# Patient Record
Sex: MALE | Race: WHITE | Employment: OTHER | URBAN - METROPOLITAN AREA
[De-identification: names, ages, dates, MRNs, and addresses within clinical notes are randomized per-mention and may not be internally consistent; named-entity substitution may affect disease eponyms.]

---

## 2023-12-08 ENCOUNTER — OFFICE VISIT (OUTPATIENT)
Dept: CARDIOLOGY | Facility: HOSPITAL | Age: 78
End: 2023-12-08
Payer: MEDICARE

## 2023-12-08 VITALS
HEIGHT: 73 IN | BODY MASS INDEX: 26.11 KG/M2 | SYSTOLIC BLOOD PRESSURE: 149 MMHG | DIASTOLIC BLOOD PRESSURE: 81 MMHG | WEIGHT: 197 LBS | OXYGEN SATURATION: 98 % | HEART RATE: 83 BPM

## 2023-12-08 DIAGNOSIS — I50.20 ACC/AHA STAGE C SYSTOLIC HEART FAILURE (MULTI): Primary | ICD-10-CM

## 2023-12-08 DIAGNOSIS — I48.0 PAROXYSMAL ATRIAL FIBRILLATION (MULTI): ICD-10-CM

## 2023-12-08 DIAGNOSIS — I42.8 NONISCHEMIC CARDIOMYOPATHY (MULTI): ICD-10-CM

## 2023-12-08 PROCEDURE — 1126F AMNT PAIN NOTED NONE PRSNT: CPT | Performed by: INTERNAL MEDICINE

## 2023-12-08 PROCEDURE — 99205 OFFICE O/P NEW HI 60 MIN: CPT | Performed by: INTERNAL MEDICINE

## 2023-12-08 PROCEDURE — 99215 OFFICE O/P EST HI 40 MIN: CPT | Performed by: INTERNAL MEDICINE

## 2023-12-08 ASSESSMENT — PAIN SCALES - GENERAL: PAINLEVEL: 0-NO PAIN

## 2023-12-08 NOTE — PATIENT INSTRUCTIONS
It was a pleasure seeing you today. Please contact myself or my team with any questions.     To reach Dr. Parkinson' office please call [phone] (Brenda).   Fax: [phone]   To schedule an appointment call [phone]     If you have any questions or need cardiac medication refills, please call the Heart Failure office at [phone], option 6. You may also contact the  Heart Failure Nursing team via email at [email or web address] (Please include your name and date of birth).         1) Stop valsartan   2) Start entresto 24/26 mg twice a day  3) Start jardiance 10 mg once a day  4) Labs in 7-10 days (RFP/BNP)  5) Call or email with your blood pressure and heart rate in 2 weeks  6) Follow up 3 months at /[facility]      Ph[facility]  [facility]

## 2023-12-08 NOTE — PROGRESS NOTES
"[facility] Advanced Heart Failure Clinic  Primary Care Physician: Rick Sawyer  Referring Provider/Cardiologist: Lore ([facility])     Date of Visit: 12/08/2023  2:00 PM EST  Location of visit: [facility]     HPI:   Mr. 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     Here with his wife today. He reports ongoing exertional dyspnea. He has undergone an evaluation including a repeat cardiac catheterization to look for ischemic heart disease which was not found. He is also being evaluated for possible upgrade to CRT therapy.     He current denies chest pain, pressure, or palpitations. He reports ongoing dyspnea and LE edema. He occasionally monitors his vitals at home.       PMHx:  CAD s/p PCI (RCA; 2006), stage C systolic HF/NICM/HFrEF with severe LV dysfunction (LVEF 20-25%) currently without an ICD, AF on DOAC, SSS s/p ppm, HTN, DM    SocHx:  , lives with his wife in [facility]  Denies tobacco/etoh/illicits    FamHx:  Father with CAD        Visit Vitals  /81 (BP Location: Left arm, Patient Position: Sitting)   Pulse 83   Ht 1.854 m (6' 1\")   Wt 89.4 kg (197 lb)   SpO2 98%   BMI 25.99 kg/m²   Smoking Status Never Assessed   BSA 2.15 m²        Physical Exam:  On exam Mr. Nolasco appears his stated age, is alert and oriented x3, and in no acute distress. His sclera are anicteric and his oropharynx has moist mucous membranes. His neck is supple and without thyromegaly. The JVP is ~10 cm of water above the right atrium. His cardiac exam has regular rhythm, normal S1, S2. No S3/4. There is a II/VI HSM. His lungs are clear to auscultation bilaterally and there is no dullness to percussion. His abdomen is soft, nontender with normoactive bowel sounds. There is no HJR. " The extremities are warm and with trace-1+ pitting edema. The skin is dry. There is no rash present. The distal pulses are 2+ in all four extremities. His mood and affect are appropriate for todays encounter.       Cardiac Labs/Diagnostics:  Echo (10/20/23; [facility])  LVEF 25-30%  Mod MR  Mildly enlarged RV      Impression/Plan:  Mr. Nolasco is a 78M with a PMHx sig for CAD s/p PCI (RCA; 2006), stage C systolic HF/NICM/HFrEF with severe LV dysfunction (LVEF 25-30%) currently without an ICD, AF on DOAC, SSS s/p ppm, HTN, and DM who was referred to the Advanced Heart Failure clinic for consultation. At the current time he has functional class II symptoms and appears modestly hypervolemic on exam.     1) Stage C acute on chronic systolic HF/NICM/HFrEF with severe LV dysfunction (LVEF 25-30%; 10/2023) currently without an ICD  Cardiomyopathy out of proportion to CAD. s/p DCCV earlier this year for AF (while in AF he experienced RV pacing; otherwise he is atrial paced). He is being evaluated for possible CRT upgrade. His recent labs show intact end organ function, but hyponatremia. Will focus on optimizing GDMT and then reassess.   -c/w metoprolol succinate 75 mg daily, spironolactone 12.5 mg every other day, furosemide 20 mg daily/prn  -discontinue valsartan  -start entresto 24/26 mg bid  -start jardiance 10 mg daily  -labs in 7-10 days (RFP/BNP)  -monitor vitals and report in 2 weeks to allow further uptitration of GDMT      F/U: 3 months at /[facility]    Dr. MURRAY,  Thank you for referring Mr. Nolasco to the  Advanced Heart Failure Clinic. Please let me know if you have any questions.     ____________________________________________________________  Jarad Parkinson DO  Section of Advanced Heart Failure and Cardiac Transplantation  Division of Cardiovascular Medicine  [facility] [place]  [facility]

## 2023-12-22 ENCOUNTER — LAB (OUTPATIENT)
Dept: LAB | Facility: LAB | Age: 78
End: 2023-12-22
Payer: MEDICARE

## 2023-12-22 DIAGNOSIS — I50.20 ACC/AHA STAGE C SYSTOLIC HEART FAILURE (MULTI): ICD-10-CM

## 2023-12-22 LAB
ALBUMIN SERPL BCP-MCNC: 4.3 G/DL (ref 3.4–5)
ANION GAP SERPL CALC-SCNC: 9 MMOL/L (ref 10–20)
BNP SERPL-MCNC: 382 PG/ML (ref 0–99)
BUN SERPL-MCNC: 23 MG/DL (ref 6–23)
CALCIUM SERPL-MCNC: 9.3 MG/DL (ref 8.6–10.3)
CHLORIDE SERPL-SCNC: 94 MMOL/L (ref 98–107)
CO2 SERPL-SCNC: 29 MMOL/L (ref 21–32)
CREAT SERPL-MCNC: 0.86 MG/DL (ref 0.5–1.3)
GFR SERPL CREATININE-BSD FRML MDRD: 89 ML/MIN/1.73M*2
GLUCOSE SERPL-MCNC: 128 MG/DL (ref 74–99)
PHOSPHATE SERPL-MCNC: 4.8 MG/DL (ref 2.5–4.9)
POTASSIUM SERPL-SCNC: 4.8 MMOL/L (ref 3.5–5.3)
SODIUM SERPL-SCNC: 127 MMOL/L (ref 136–145)

## 2023-12-22 PROCEDURE — 36415 COLL VENOUS BLD VENIPUNCTURE: CPT

## 2023-12-22 PROCEDURE — 80069 RENAL FUNCTION PANEL: CPT

## 2023-12-22 PROCEDURE — 83880 ASSAY OF NATRIURETIC PEPTIDE: CPT

## 2024-01-02 DIAGNOSIS — I48.0 PAROXYSMAL ATRIAL FIBRILLATION (MULTI): Primary | ICD-10-CM

## 2024-01-04 DIAGNOSIS — I50.20 ACC/AHA STAGE C SYSTOLIC HEART FAILURE (MULTI): Primary | ICD-10-CM

## 2024-01-10 ENCOUNTER — LAB (OUTPATIENT)
Dept: LAB | Facility: LAB | Age: 79
End: 2024-01-10
Payer: MEDICARE

## 2024-01-10 DIAGNOSIS — I50.20 ACC/AHA STAGE C SYSTOLIC HEART FAILURE (MULTI): ICD-10-CM

## 2024-01-10 LAB
ALBUMIN SERPL BCP-MCNC: 3.8 G/DL (ref 3.4–5)
ANION GAP SERPL CALC-SCNC: 11 MMOL/L (ref 10–20)
BNP SERPL-MCNC: 312 PG/ML (ref 0–99)
BUN SERPL-MCNC: 35 MG/DL (ref 6–23)
CALCIUM SERPL-MCNC: 9.2 MG/DL (ref 8.6–10.3)
CHLORIDE SERPL-SCNC: 99 MMOL/L (ref 98–107)
CO2 SERPL-SCNC: 28 MMOL/L (ref 21–32)
CREAT SERPL-MCNC: 0.88 MG/DL (ref 0.5–1.3)
EGFRCR SERPLBLD CKD-EPI 2021: 89 ML/MIN/1.73M*2
GLUCOSE SERPL-MCNC: 93 MG/DL (ref 74–99)
PHOSPHATE SERPL-MCNC: 4.3 MG/DL (ref 2.5–4.9)
POTASSIUM SERPL-SCNC: 5.1 MMOL/L (ref 3.5–5.3)
SODIUM SERPL-SCNC: 133 MMOL/L (ref 136–145)

## 2024-01-10 PROCEDURE — 80069 RENAL FUNCTION PANEL: CPT

## 2024-01-10 PROCEDURE — 83880 ASSAY OF NATRIURETIC PEPTIDE: CPT

## 2024-01-10 PROCEDURE — 36415 COLL VENOUS BLD VENIPUNCTURE: CPT

## 2024-01-12 DIAGNOSIS — I50.20 ACC/AHA STAGE C SYSTOLIC HEART FAILURE (MULTI): ICD-10-CM

## 2024-01-12 DIAGNOSIS — I42.8 NONISCHEMIC CARDIOMYOPATHY (MULTI): Primary | ICD-10-CM

## 2024-01-12 RX ORDER — METOPROLOL SUCCINATE 100 MG/1
200 TABLET, EXTENDED RELEASE ORAL DAILY
Qty: 60 TABLET | Refills: 3 | Status: SHIPPED | OUTPATIENT
Start: 2024-01-12 | End: 2024-01-16 | Stop reason: SDUPTHER

## 2024-01-16 DIAGNOSIS — I42.8 NONISCHEMIC CARDIOMYOPATHY (MULTI): ICD-10-CM

## 2024-01-16 DIAGNOSIS — I50.20 ACC/AHA STAGE C SYSTOLIC HEART FAILURE (MULTI): ICD-10-CM

## 2024-01-16 RX ORDER — METOPROLOL SUCCINATE 100 MG/1
100 TABLET, EXTENDED RELEASE ORAL DAILY
Qty: 90 TABLET | Refills: 3 | Status: SHIPPED | OUTPATIENT
Start: 2024-01-16 | End: 2024-05-13

## 2024-01-24 ENCOUNTER — LAB (OUTPATIENT)
Dept: LAB | Facility: LAB | Age: 79
End: 2024-01-24
Payer: MEDICARE

## 2024-01-25 DIAGNOSIS — I50.20 ACC/AHA STAGE C SYSTOLIC HEART FAILURE (MULTI): Primary | ICD-10-CM

## 2024-02-05 ENCOUNTER — TELEPHONE (OUTPATIENT)
Dept: NEUROSURGERY | Facility: CLINIC | Age: 79
End: 2024-02-05
Payer: MEDICARE

## 2024-02-07 ENCOUNTER — LAB (OUTPATIENT)
Dept: LAB | Facility: LAB | Age: 79
End: 2024-02-07
Payer: MEDICARE

## 2024-02-07 DIAGNOSIS — I50.20 ACC/AHA STAGE C SYSTOLIC HEART FAILURE (MULTI): ICD-10-CM

## 2024-02-07 LAB
ALBUMIN SERPL BCP-MCNC: 3.8 G/DL (ref 3.4–5)
ANION GAP SERPL CALC-SCNC: 10 MMOL/L (ref 10–20)
BNP SERPL-MCNC: 373 PG/ML (ref 0–99)
BUN SERPL-MCNC: 21 MG/DL (ref 6–23)
CALCIUM SERPL-MCNC: 8.6 MG/DL (ref 8.6–10.3)
CHLORIDE SERPL-SCNC: 91 MMOL/L (ref 98–107)
CO2 SERPL-SCNC: 29 MMOL/L (ref 21–32)
CREAT SERPL-MCNC: 0.75 MG/DL (ref 0.5–1.3)
EGFRCR SERPLBLD CKD-EPI 2021: >90 ML/MIN/1.73M*2
GLUCOSE SERPL-MCNC: 131 MG/DL (ref 74–99)
PHOSPHATE SERPL-MCNC: 4.8 MG/DL (ref 2.5–4.9)
POTASSIUM SERPL-SCNC: 4.3 MMOL/L (ref 3.5–5.3)
SODIUM SERPL-SCNC: 126 MMOL/L (ref 136–145)

## 2024-02-07 PROCEDURE — 36415 COLL VENOUS BLD VENIPUNCTURE: CPT

## 2024-02-07 PROCEDURE — 80069 RENAL FUNCTION PANEL: CPT

## 2024-02-07 PROCEDURE — 83880 ASSAY OF NATRIURETIC PEPTIDE: CPT

## 2024-02-09 DIAGNOSIS — I50.20 ACC/AHA STAGE C SYSTOLIC HEART FAILURE (MULTI): ICD-10-CM

## 2024-02-21 ENCOUNTER — LAB (OUTPATIENT)
Dept: LAB | Facility: LAB | Age: 79
End: 2024-02-21
Payer: MEDICARE

## 2024-02-21 DIAGNOSIS — I50.20 ACC/AHA STAGE C SYSTOLIC HEART FAILURE (MULTI): Primary | ICD-10-CM

## 2024-02-21 DIAGNOSIS — I50.20 ACC/AHA STAGE C SYSTOLIC HEART FAILURE (MULTI): ICD-10-CM

## 2024-02-21 LAB
ALBUMIN SERPL BCP-MCNC: 3.7 G/DL (ref 3.4–5)
ANION GAP SERPL CALC-SCNC: 10 MMOL/L (ref 10–20)
BNP SERPL-MCNC: 338 PG/ML (ref 0–99)
BUN SERPL-MCNC: 19 MG/DL (ref 6–23)
CALCIUM SERPL-MCNC: 8.8 MG/DL (ref 8.6–10.3)
CHLORIDE SERPL-SCNC: 94 MMOL/L (ref 98–107)
CO2 SERPL-SCNC: 29 MMOL/L (ref 21–32)
CREAT SERPL-MCNC: 0.67 MG/DL (ref 0.5–1.3)
EGFRCR SERPLBLD CKD-EPI 2021: >90 ML/MIN/1.73M*2
GLUCOSE SERPL-MCNC: 118 MG/DL (ref 74–99)
PHOSPHATE SERPL-MCNC: 4.8 MG/DL (ref 2.5–4.9)
POTASSIUM SERPL-SCNC: 4.5 MMOL/L (ref 3.5–5.3)
SODIUM SERPL-SCNC: 128 MMOL/L (ref 136–145)

## 2024-02-21 PROCEDURE — 36415 COLL VENOUS BLD VENIPUNCTURE: CPT

## 2024-02-21 PROCEDURE — 80069 RENAL FUNCTION PANEL: CPT

## 2024-02-21 PROCEDURE — 83880 ASSAY OF NATRIURETIC PEPTIDE: CPT

## 2024-03-11 ENCOUNTER — OFFICE VISIT (OUTPATIENT)
Dept: CARDIOLOGY | Facility: CLINIC | Age: 79
End: 2024-03-11
Payer: MEDICARE

## 2024-03-11 VITALS
HEART RATE: 87 BPM | WEIGHT: 182 LBS | SYSTOLIC BLOOD PRESSURE: 107 MMHG | OXYGEN SATURATION: 95 % | BODY MASS INDEX: 24.12 KG/M2 | HEIGHT: 73 IN | DIASTOLIC BLOOD PRESSURE: 71 MMHG

## 2024-03-11 DIAGNOSIS — I42.8 NONISCHEMIC CARDIOMYOPATHY (MULTI): ICD-10-CM

## 2024-03-11 DIAGNOSIS — Z95.810 CARDIAC RESYNCHRONIZATION THERAPY DEFIBRILLATOR (CRT-D) IN PLACE: ICD-10-CM

## 2024-03-11 DIAGNOSIS — I50.20 ACC/AHA STAGE C SYSTOLIC HEART FAILURE (MULTI): Primary | ICD-10-CM

## 2024-03-11 PROCEDURE — 1036F TOBACCO NON-USER: CPT | Performed by: INTERNAL MEDICINE

## 2024-03-11 PROCEDURE — 99214 OFFICE O/P EST MOD 30 MIN: CPT | Performed by: INTERNAL MEDICINE

## 2024-03-11 PROCEDURE — 1160F RVW MEDS BY RX/DR IN RCRD: CPT | Performed by: INTERNAL MEDICINE

## 2024-03-11 PROCEDURE — 1126F AMNT PAIN NOTED NONE PRSNT: CPT | Performed by: INTERNAL MEDICINE

## 2024-03-11 PROCEDURE — 1159F MED LIST DOCD IN RCRD: CPT | Performed by: INTERNAL MEDICINE

## 2024-03-11 RX ORDER — MUPIROCIN 20 MG/G
OINTMENT TOPICAL 2 TIMES DAILY
COMMUNITY
Start: 2023-08-07

## 2024-03-11 RX ORDER — DIPHENOXYLATE HYDROCHLORIDE AND ATROPINE SULFATE 2.5; .025 MG/1; MG/1
TABLET ORAL
COMMUNITY
Start: 2023-07-07 | End: 2024-03-11 | Stop reason: ALTCHOICE

## 2024-03-11 RX ORDER — ALBUTEROL SULFATE 90 UG/1
2 AEROSOL, METERED RESPIRATORY (INHALATION) EVERY 4 HOURS PRN
COMMUNITY
Start: 2024-01-25

## 2024-03-11 RX ORDER — ALPRAZOLAM 2 MG/1
TABLET ORAL
COMMUNITY
Start: 2024-01-29 | End: 2024-03-11 | Stop reason: WASHOUT

## 2024-03-11 RX ORDER — HYDRALAZINE HYDROCHLORIDE 25 MG/1
TABLET, FILM COATED ORAL
COMMUNITY
Start: 2015-11-12 | End: 2024-03-11 | Stop reason: ALTCHOICE

## 2024-03-11 RX ORDER — LORATADINE 10 MG/1
1 TABLET ORAL AS NEEDED
COMMUNITY
Start: 2013-03-18

## 2024-03-11 RX ORDER — LANOLIN ALCOHOL/MO/W.PET/CERES
1 CREAM (GRAM) TOPICAL DAILY
COMMUNITY
Start: 2015-09-22 | End: 2024-03-11 | Stop reason: ALTCHOICE

## 2024-03-11 RX ORDER — MONTELUKAST SODIUM 10 MG/1
TABLET ORAL
COMMUNITY
Start: 2015-07-14

## 2024-03-11 RX ORDER — INSULIN LISPRO 100 [IU]/ML
INJECTION, SOLUTION INTRAVENOUS; SUBCUTANEOUS
COMMUNITY
Start: 2015-08-05

## 2024-03-11 RX ORDER — AMLODIPINE BESYLATE 5 MG/1
TABLET ORAL
COMMUNITY
Start: 2020-02-17 | End: 2024-03-11 | Stop reason: ALTCHOICE

## 2024-03-11 RX ORDER — FUROSEMIDE 20 MG/1
20 TABLET ORAL AS NEEDED
COMMUNITY
Start: 2023-09-21

## 2024-03-11 RX ORDER — NITROGLYCERIN 0.4 MG/1
TABLET SUBLINGUAL
COMMUNITY

## 2024-03-11 RX ORDER — ATENOLOL 50 MG/1
TABLET ORAL
COMMUNITY
End: 2024-03-11 | Stop reason: ALTCHOICE

## 2024-03-11 RX ORDER — ALPRAZOLAM 1 MG/1
TABLET ORAL
COMMUNITY
Start: 2016-01-20

## 2024-03-11 RX ORDER — FAMOTIDINE 10 MG/ML
INJECTION INTRAVENOUS
COMMUNITY
End: 2024-03-11 | Stop reason: ALTCHOICE

## 2024-03-11 RX ORDER — MONTELUKAST SODIUM 10 MG/1
TABLET ORAL
COMMUNITY
Start: 2010-01-08 | End: 2024-03-11 | Stop reason: ALTCHOICE

## 2024-03-11 RX ORDER — BLOOD SUGAR DIAGNOSTIC
STRIP MISCELLANEOUS
COMMUNITY
Start: 2023-12-26

## 2024-03-11 RX ORDER — DOXYCYCLINE 100 MG/1
100 CAPSULE ORAL 2 TIMES DAILY
COMMUNITY
Start: 2024-01-03 | End: 2024-03-11 | Stop reason: ALTCHOICE

## 2024-03-11 RX ORDER — LEVALBUTEROL TARTRATE 45 UG/1
AEROSOL, METERED ORAL
COMMUNITY
Start: 2014-04-11

## 2024-03-11 RX ORDER — ATORVASTATIN CALCIUM 10 MG/1
10 TABLET, FILM COATED ORAL EVERY OTHER DAY
COMMUNITY
Start: 2017-09-21

## 2024-03-11 RX ORDER — INSULIN HUMAN 100 [IU]/ML
INJECTION, SUSPENSION SUBCUTANEOUS
COMMUNITY
Start: 2015-09-01

## 2024-03-11 RX ORDER — ACETAMINOPHEN 500 MG
TABLET ORAL
COMMUNITY
Start: 2014-05-29 | End: 2024-03-11 | Stop reason: ALTCHOICE

## 2024-03-11 RX ORDER — FLUTICASONE PROPIONATE 50 MCG
SPRAY, SUSPENSION (ML) NASAL
COMMUNITY
Start: 2015-04-20

## 2024-03-11 RX ORDER — INSULIN ASPART 100 [IU]/ML
INJECTION, SOLUTION INTRAVENOUS; SUBCUTANEOUS
COMMUNITY

## 2024-03-11 RX ORDER — ATORVASTATIN CALCIUM 10 MG/1
10 TABLET, FILM COATED ORAL
COMMUNITY
Start: 2017-07-25 | End: 2024-03-11 | Stop reason: ALTCHOICE

## 2024-03-11 RX ORDER — LANOLIN ALCOHOL/MO/W.PET/CERES
CREAM (GRAM) TOPICAL
COMMUNITY
Start: 2013-03-18 | End: 2024-03-11 | Stop reason: ALTCHOICE

## 2024-03-11 RX ORDER — INSULIN LISPRO 100 [IU]/ML
INJECTION, SOLUTION INTRAVENOUS; SUBCUTANEOUS
COMMUNITY
End: 2024-03-11 | Stop reason: ALTCHOICE

## 2024-03-11 RX ORDER — METOPROLOL TARTRATE 75 MG/1
75 TABLET, FILM COATED ORAL 2 TIMES DAILY
COMMUNITY
End: 2024-03-11 | Stop reason: ALTCHOICE

## 2024-03-11 RX ORDER — CLOPIDOGREL BISULFATE 75 MG/1
75 TABLET ORAL DAILY
COMMUNITY
End: 2024-03-11 | Stop reason: ALTCHOICE

## 2024-03-11 RX ORDER — METOPROLOL TARTRATE 100 MG/1
1 TABLET ORAL 2 TIMES DAILY
COMMUNITY
Start: 2016-03-11 | End: 2024-03-11 | Stop reason: ALTCHOICE

## 2024-03-11 RX ORDER — FINASTERIDE 5 MG/1
TABLET, FILM COATED ORAL
COMMUNITY
Start: 2023-07-14 | End: 2024-03-11 | Stop reason: ALTCHOICE

## 2024-03-11 RX ORDER — CIPROFLOXACIN 500 MG/1
TABLET ORAL
COMMUNITY
Start: 2024-01-31 | End: 2024-03-11 | Stop reason: ALTCHOICE

## 2024-03-11 RX ORDER — ASPIRIN 81 MG/1
TABLET ORAL
COMMUNITY
Start: 2013-03-18

## 2024-03-11 RX ORDER — METOPROLOL TARTRATE 50 MG/1
TABLET ORAL
COMMUNITY
Start: 2024-01-10 | End: 2024-03-11 | Stop reason: ALTCHOICE

## 2024-03-11 RX ORDER — CLONIDINE HYDROCHLORIDE 0.1 MG/1
0.1 TABLET ORAL
COMMUNITY
End: 2024-03-11 | Stop reason: ALTCHOICE

## 2024-03-11 RX ORDER — CLONIDINE HYDROCHLORIDE 0.3 MG/1
TABLET ORAL
COMMUNITY
Start: 2016-04-10 | End: 2024-03-11 | Stop reason: ALTCHOICE

## 2024-03-11 NOTE — PROGRESS NOTES
[facility] Advanced Heart Failure Clinic  Primary Care Physician: Rick Sawyer  Referring Provider/Cardiologist: Lore ([facility])     Date of Visit: 03/11/2024  3:40 PM EDT  Location of visit: [address]     HPI:   Mr. Nolasco is a 78M with a PMHx sig for CAD s/p PCI (RCA; 2006), stage C systolic HF/NICM/HFrEF with severe LV dysfunction (LVEF 20-25%) currently without an ICD, AF on DOAC, SSS s/p ppm, HTN, and DM who returns to the Advanced Heart Failure clinic for ongoing evaluation and management.      Interval hx:   Since he was last seen he had issues with tolerance of his GDMT and had symptomatic hypotension. He has since had his medications adjusted by Dr. MURRAY and by himself (taking entresto just once a day). In addition, he underwent placement of CRT-D with Dr. CASTILLO on 2/29. Currently without any concerns for the pocket.      He denies chest pain, palpitations, shortness of breath, dyspnea on exertion, orthopnea, PND. No edema noted in BLE. Patient denies headaches, dizziness or recent falls.         PMHx:  CAD s/p PCI (RCA; 2006), stage C systolic HF/NICM/HFrEF with severe LV dysfunction (LVEF 20-25%) s/p CRT-D, AF on DOAC, SSS s/p ppm, HTN, DM    SocHx:  , lives with his wife in [place] [facility]  Denies tobacco/etoh/illicits    FamHx:  Father with CAD      Current Outpatient Medications   Medication Sig Dispense Refill    albuterol 90 mcg/actuation inhaler Inhale 2 puffs every 4 hours if needed.      ALPRAZolam (Xanax) 1 mg tablet Take by mouth.      apixaban (Eliquis) 5 mg tablet Take 1 tablet (5 mg) by mouth 2 times a day. 60 tablet 10    aspirin 81 mg EC tablet Take by mouth.      atorvastatin (Lipitor) 10 mg tablet Take 1 tablet (10 mg) by mouth every other day.      beclomethasone dipropionate (Qvar) 80 mcg/actuation inhaler Inhale.      empagliflozin (Jardiance) 10 mg Take 1 tablet (10 mg) by mouth once daily. 90 tablet 3    fluticasone (Flonase) 50 mcg/actuation nasal spray        furosemide (Lasix) 20 mg tablet Take 1 tablet (20 mg) by mouth if needed.      HumuLIN N NPH U-100 Insulin 100 unit/mL injection mild scale, Subcutaneous, DAILY AFTER BREAKFAST, Date: 1/25/17 8:07:00 AM EST, Injection Charge      insulin aspart (NovoLOG U-100 Insulin aspart) 100 unit/mL injection       insulin lispro (HumaLOG U-100 Insulin) 100 unit/mL injection INJECT SUBCUTANEOUSLY AS DIRECTED BY PHYSICIAN FOR DIABETES USING SLIDING SCALE      INSULIN LISPRO SUBQ INJECT 6 TO 12 UNITS UNDER THE SKIN THREE (3) TIMES A DAY BEFORE MEALS (DISCARD BOTTLE 28 DAYS AFTER OPENING)      levalbuterol (Xopenex) 45 mcg/actuation inhaler USE 2 PUFFS BY MOUTH EVERY 4 HOURS AS NEEDED FOR COUGH OR WHEEZE (PLEASE REORDER 2 BUSINESS DAYS IN ADVANCE)      loratadine (Claritin) 10 mg tablet Take 1 tablet (10 mg) by mouth if needed.      metoprolol succinate XL (Toprol-XL) 100 mg 24 hr tablet Take 1 tablet (100 mg) by mouth once daily. Do not crush or chew. 90 tablet 3    montelukast (Singulair) 10 mg tablet       mupirocin (Bactroban) 2 % ointment Apply topically twice a day.      nitroglycerin (Nitrostat) 0.4 mg SL tablet       OneTouch Ultra Test strip       sacubitriL-valsartan (Entresto) 24-26 mg tablet Take 1 tablet by mouth 2 times a day. (Patient taking differently: Take 0.5 tablets by mouth once daily.) 180 tablet 3    ALPRAZolam (Xanax) 2 mg tablet TAKE 1/2 TABLET BY MOUTH AT BEDTIME AS NEEDED FOR ANXIETY       No current facility-administered medications for this visit.       Allergies   Allergen Reactions    Allerg Xt,D.Farinae-D.Pteronys Shortness of breath     Pt states slight shortness of breath.    Ace Inhibitors Other     dizziness even though bp normal to high, dizziness even though bp normal to high    dizziness, dizziness    Acetaminophen Other     Pain    creates pain    Allopurinol Analogues Unknown    Chlorphen-Phenyleph-Hydrocodon Other    Codeine Other    Epinephrine Other     Pt. States blood pressure  "\"shoots up\"       Pt. States blood pressure \"shoots up\"    Felodipine Unknown     Headache, Headache    Haemophilus Influenzae Type B Other     Other reaction(s): Asthma and/or Shortness of Breath    House Dust Mite Other    Hydrocodone-Acetaminophen Other     04/07/2005;HEART ATTACK SYMPTOMS, 04/07/2005;HEART ATTACK SYMPTOMS    Other reaction(s): Asthma and/or Shortness of Breath, Other (See Comments)   04/07/2005;HEART ATTACK SYMPTOMS, 04/07/2005;HEART ATTACK SYMPTOMS    Losartan Other     Dizzy, Refuses to use it again, Dizzy, Refuses to use it again    Metformin Other     09/10/2002;CONTINUOUS HEADACHE, 09/10/2002;CONTINUOUS HEADACHE    Other reaction(s): Other (See Comments)   09/10/2002;CONTINUOUS HEADACHE, 09/10/2002;CONTINUOUS HEADACHE    Nortriptyline Other     Felt very sick, Felt very sick    Penicillin Hives    Pioglitazone Other     07/29/2005;DIZZINESS, 07/29/2005;DIZZINESS    Other reaction(s): Other (See Comments)   07/29/2005;DIZZINESS, 07/29/2005;DIZZINESS    Ragweed Other    Statins-Hmg-Coa Reductase Inhibitors Unknown     Makes him feel funny, dizziness, Makes him feel funny, dizziness    Bad reaction of high dose of lipitor    Thiazides Other     headache, headache    Weed Pollen-Short Ragweed Other         Visit Vitals  /71 (BP Location: Right arm, Patient Position: Sitting)   Pulse 87   Ht 1.854 m (6' 1\")   Wt 82.6 kg (182 lb)   SpO2 95%   BMI 24.01 kg/m²   Smoking Status Never Assessed   BSA 2.06 m²        Physical Exam:  On exam Mr. Nolasco appears his stated age, is alert and oriented x3, and in no acute distress. His sclera are anicteric and his oropharynx has moist mucous membranes. His neck is supple and without thyromegaly. The JVP is ~6 cm of water above the right atrium. His cardiac exam has regular rhythm, normal S1, S2. No S3/4. There is a II/VI HSM. His lungs are clear to auscultation bilaterally and there is no dullness to percussion. His abdomen is soft, nontender with " normoactive bowel sounds. There is no HJR. The extremities are warm and without sig edema. The skin is dry. There is no rash present. The distal pulses are 2+ in all four extremities. His mood and affect are appropriate for todays encounter.       Cardiac Labs/Diagnostics:    Lab Results   Component Value Date    CREATININE 0.67 02/21/2024    BUN 19 02/21/2024     (L) 02/21/2024    K 4.5 02/21/2024    CL 94 (L) 02/21/2024    CO2 29 02/21/2024            Recent Labs     02/21/24  1325 02/07/24  1329 01/10/24  1243 12/22/23  1356   * 373* 312* 382*     Echo (3/1/24; [facility]):  V-V optimization    Echo (1/12/24; [facility]):  LVEF 25-30%    Echo (10/20/23; [facility])  LVEF 25-30%  Mod MR  Mildly enlarged RV      Impression/Plan:  Mr. Nolasco is a 78M with a PMHx sig for CAD s/p PCI (RCA; 2006), stage C systolic HF/NICM/HFrEF with severe LV dysfunction (LVEF 25-30%) s/p CRT-D, AF on DOAC, SSS s/p ppm, HTN, and DM who returns to the Advanced Heart Failure clinic for ongoing evaluation and management. At the current time he has functional class II symptoms and appears euvolemic on exam.     1) Stage C chronic systolic HF/NICM/HFrEF with severe LV dysfunction (LVEF 25-30%; 1/2024) s/p CRT-D  Cardiomyopathy out of proportion to CAD. s/p DCCV earlier this year for AF (while in AF he experienced RV pacing; otherwise he was atrial paced). He recently underwent placement of CRT-D. He was unable to tolerate most GDMT due to symptomatic hypotension (pat was stopped and pt self decreased entresto to daily). Labs still with hyponatremia. He is scheduled for a repeat echo in 3 months with Dr. VILLASENOR    -c/w metoprolol succinate 100 mg daily, entresto 12/13 mg once a day, jardiance 10 mg daily, furosemide 20 mg daily/prn  -will f/u 3 month echo results when available       F/U: 3 months at /[facility]    Dr. MURRAY,  Thank you for referring Mr. Nolasco to the  Advanced Heart Failure Clinic. Please let me know if you have any  questions.      ____________________________________________________________  Jarad Parkinson DO  Section of Advanced Heart Failure and Cardiac Transplantation  Division of Cardiovascular Medicine  [facility] [place]  [facility]

## 2024-03-11 NOTE — PATIENT INSTRUCTIONS
It was a pleasure seeing you today. Please contact myself or my team with any questions.     To reach Dr. Parkinson' office please call [phone] ([place]uel[place]).   Fax: [phone]   To schedule an appointment call [phone]     If you have any questions or need cardiac medication refills, please call the Heart Failure office at [phone], option 6. You may also contact the  Heart Failure Nursing team via email at [email or web address] (Please include your name and date of birth).         1) Continue your current medications  2) Follow up in 4 months at /[facility]

## 2024-03-12 ENCOUNTER — APPOINTMENT (OUTPATIENT)
Dept: CARDIOLOGY | Facility: CLINIC | Age: 79
End: 2024-03-12
Payer: MEDICARE

## 2024-05-13 DIAGNOSIS — I42.8 NONISCHEMIC CARDIOMYOPATHY (MULTI): ICD-10-CM

## 2024-05-13 DIAGNOSIS — I50.20 ACC/AHA STAGE C SYSTOLIC HEART FAILURE (MULTI): ICD-10-CM

## 2024-05-13 RX ORDER — METOPROLOL SUCCINATE 100 MG/1
TABLET, EXTENDED RELEASE ORAL
Qty: 180 TABLET | Refills: 2 | Status: SHIPPED | OUTPATIENT
Start: 2024-05-13

## 2024-06-10 ENCOUNTER — TELEPHONE (OUTPATIENT)
Dept: CARDIOLOGY | Facility: HOSPITAL | Age: 79
End: 2024-06-10
Payer: MEDICARE

## 2024-06-10 DIAGNOSIS — I48.0 PAROXYSMAL ATRIAL FIBRILLATION (MULTI): ICD-10-CM

## 2024-06-13 DIAGNOSIS — I50.20 ACC/AHA STAGE C SYSTOLIC HEART FAILURE (MULTI): Primary | ICD-10-CM

## 2024-06-24 ENCOUNTER — LAB (OUTPATIENT)
Dept: LAB | Facility: LAB | Age: 79
End: 2024-06-24
Payer: MEDICARE

## 2024-06-24 DIAGNOSIS — I50.20 ACC/AHA STAGE C SYSTOLIC HEART FAILURE (MULTI): ICD-10-CM

## 2024-06-24 LAB
ALBUMIN SERPL BCP-MCNC: 4.1 G/DL (ref 3.4–5)
ANION GAP SERPL CALC-SCNC: 9 MMOL/L (ref 10–20)
BNP SERPL-MCNC: 702 PG/ML (ref 0–99)
BUN SERPL-MCNC: 19 MG/DL (ref 6–23)
CALCIUM SERPL-MCNC: 8.6 MG/DL (ref 8.6–10.3)
CHLORIDE SERPL-SCNC: 93 MMOL/L (ref 98–107)
CO2 SERPL-SCNC: 30 MMOL/L (ref 21–32)
CREAT SERPL-MCNC: 0.72 MG/DL (ref 0.5–1.3)
EGFRCR SERPLBLD CKD-EPI 2021: >90 ML/MIN/1.73M*2
GLUCOSE SERPL-MCNC: 113 MG/DL (ref 74–99)
PHOSPHATE SERPL-MCNC: 4.3 MG/DL (ref 2.5–4.9)
POTASSIUM SERPL-SCNC: 4.5 MMOL/L (ref 3.5–5.3)
SODIUM SERPL-SCNC: 127 MMOL/L (ref 136–145)

## 2024-06-24 PROCEDURE — 36415 COLL VENOUS BLD VENIPUNCTURE: CPT

## 2024-06-24 PROCEDURE — 83880 ASSAY OF NATRIURETIC PEPTIDE: CPT

## 2024-06-24 PROCEDURE — 80069 RENAL FUNCTION PANEL: CPT

## 2024-07-16 ENCOUNTER — APPOINTMENT (OUTPATIENT)
Dept: CARDIOLOGY | Facility: CLINIC | Age: 79
End: 2024-07-16
Payer: MEDICARE

## 2024-07-16 VITALS
DIASTOLIC BLOOD PRESSURE: 73 MMHG | SYSTOLIC BLOOD PRESSURE: 130 MMHG | BODY MASS INDEX: 24.52 KG/M2 | OXYGEN SATURATION: 100 % | HEIGHT: 73 IN | WEIGHT: 185 LBS | HEART RATE: 75 BPM

## 2024-07-16 DIAGNOSIS — I42.8 NONISCHEMIC CARDIOMYOPATHY (MULTI): ICD-10-CM

## 2024-07-16 DIAGNOSIS — I50.20 ACC/AHA STAGE C SYSTOLIC HEART FAILURE (MULTI): Primary | ICD-10-CM

## 2024-07-16 PROCEDURE — 1160F RVW MEDS BY RX/DR IN RCRD: CPT | Performed by: INTERNAL MEDICINE

## 2024-07-16 PROCEDURE — 1159F MED LIST DOCD IN RCRD: CPT | Performed by: INTERNAL MEDICINE

## 2024-07-16 PROCEDURE — 1036F TOBACCO NON-USER: CPT | Performed by: INTERNAL MEDICINE

## 2024-07-16 PROCEDURE — 1126F AMNT PAIN NOTED NONE PRSNT: CPT | Performed by: INTERNAL MEDICINE

## 2024-07-16 PROCEDURE — 99213 OFFICE O/P EST LOW 20 MIN: CPT | Performed by: INTERNAL MEDICINE

## 2024-07-16 PROCEDURE — 93000 ELECTROCARDIOGRAM COMPLETE: CPT | Performed by: INTERNAL MEDICINE

## 2024-07-16 ASSESSMENT — PAIN SCALES - GENERAL: PAINLEVEL: 0-NO PAIN

## 2024-07-16 NOTE — PATIENT INSTRUCTIONS
It was a pleasure seeing you today. Please contact myself or my team with any questions.     To reach Dr. Parkinson' office please call [phone] (Manuel[place]).   Fax: [phone]   To schedule an appointment call [phone]     If you have any questions or need cardiac medication refills, please call the Heart Failure office at [phone], option 6. You may also contact the  Heart Failure Nursing team via email at [email or web address] (Please include your name and date of birth).         1) Continue your current medications  2) Labs in 2-3 months (RFP/BNP)  3) Follow up in 6 months at /[facility]

## 2024-07-16 NOTE — PROGRESS NOTES
[facility] Advanced Heart Failure Clinic  Primary Care Physician: Rick Sawyer  Referring Provider/Cardiologist: Lore ([facility])     Date of Visit: 07/16/2024  1:40 PM EDT  Location of visit: [address]     HPI:   Mr. 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     Interval hx:   Currently denies chest pain, palpitations, shortness of breath, dyspnea on exertion, orthopnea, negative PND. Edema noted in LLL. Patient denies headaches, dizziness or recent falls.     Hospitalizations: ED 6/6/24 Epistaxis; 4/28/24 ED Cystitis       PMHx:  CAD s/p PCI (RCA; 2006), stage C systolic HF/NICM/HFrEF with severe LV dysfunction s/p CRT-D, AF on DOAC, SSS s/p ppm, HTN, DM    SocHx:  , lives with his wife in [facility]  Denies tobacco/etoh/illicits    FamHx:  Father with CAD      Current Outpatient Medications   Medication Sig Dispense Refill    albuterol 90 mcg/actuation inhaler Inhale 2 puffs every 4 hours if needed.      ALPRAZolam (Xanax) 1 mg tablet Take by mouth.      apixaban (Eliquis) 5 mg tablet Take 1 tablet (5 mg) by mouth 2 times a day. 60 tablet 10    aspirin 81 mg EC tablet Take by mouth.      atorvastatin (Lipitor) 10 mg tablet Take 1 tablet (10 mg) by mouth every other day.      beclomethasone dipropionate (Qvar) 80 mcg/actuation inhaler Inhale.      empagliflozin (Jardiance) 10 mg Take 1 tablet (10 mg) by mouth once daily. 90 tablet 3    fluticasone (Flonase) 50 mcg/actuation nasal spray       furosemide (Lasix) 20 mg tablet Take 1 tablet (20 mg) by mouth if needed.      HumuLIN N NPH U-100 Insulin 100 unit/mL injection mild scale, Subcutaneous, DAILY AFTER BREAKFAST, Date: 1/25/17 8:07:00 AM EST, Injection Charge      insulin aspart (NovoLOG U-100 Insulin aspart) 100  "unit/mL injection       insulin lispro (HumaLOG U-100 Insulin) 100 unit/mL injection INJECT SUBCUTANEOUSLY AS DIRECTED BY PHYSICIAN FOR DIABETES USING SLIDING SCALE      INSULIN LISPRO SUBQ INJECT 6 TO 12 UNITS UNDER THE SKIN THREE (3) TIMES A DAY BEFORE MEALS (DISCARD BOTTLE 28 DAYS AFTER OPENING)      levalbuterol (Xopenex) 45 mcg/actuation inhaler USE 2 PUFFS BY MOUTH EVERY 4 HOURS AS NEEDED FOR COUGH OR WHEEZE (PLEASE REORDER 2 BUSINESS DAYS IN ADVANCE)      loratadine (Claritin) 10 mg tablet Take 1 tablet (10 mg) by mouth if needed.      metoprolol succinate XL (Toprol-XL) 100 mg 24 hr tablet TAKE 2 TABLETS(200 MG) BY MOUTH EVERY DAY. DO NOT CRUSH OR CHEW 180 tablet 2    montelukast (Singulair) 10 mg tablet       mupirocin (Bactroban) 2 % ointment Apply topically twice a day.      nitroglycerin (Nitrostat) 0.4 mg SL tablet       OneTouch Ultra Test strip       sacubitriL-valsartan (Entresto) 24-26 mg tablet Take 1 tablet by mouth 2 times a day. (Patient taking differently: Take 0.5 tablets by mouth once daily.) 180 tablet 3     No current facility-administered medications for this visit.       Allergies   Allergen Reactions    Allerg Xt,D.Farinae-D.Pteronys Shortness of breath     Pt states slight shortness of breath.    Ace Inhibitors Other     dizziness even though bp normal to high, dizziness even though bp normal to high    dizziness, dizziness    Acetaminophen Other     Pain    creates pain    Allopurinol Analogues Unknown    Chlorphen-Phenyleph-Hydrocodon Other    Codeine Other    Epinephrine Other     Pt. States blood pressure \"shoots up\"       Pt. States blood pressure \"shoots up\"    Felodipine Unknown     Headache, Headache    Haemophilus Influenzae Type B Other     Other reaction(s): Asthma and/or Shortness of Breath    House Dust Mite Other    Hydrocodone-Acetaminophen Other     04/07/2005;HEART ATTACK SYMPTOMS, 04/07/2005;HEART ATTACK SYMPTOMS    Other reaction(s): Asthma and/or Shortness of Breath, " "Other (See Comments)   04/07/2005;HEART ATTACK SYMPTOMS, 04/07/2005;HEART ATTACK SYMPTOMS    Losartan Other     Dizzy, Refuses to use it again, Dizzy, Refuses to use it again    Metformin Other     09/10/2002;CONTINUOUS HEADACHE, 09/10/2002;CONTINUOUS HEADACHE    Other reaction(s): Other (See Comments)   09/10/2002;CONTINUOUS HEADACHE, 09/10/2002;CONTINUOUS HEADACHE    Nortriptyline Other     Felt very sick, Felt very sick    Penicillin Hives    Pioglitazone Other     07/29/2005;DIZZINESS, 07/29/2005;DIZZINESS    Other reaction(s): Other (See Comments)   07/29/2005;DIZZINESS, 07/29/2005;DIZZINESS    Ragweed Other    Statins-Hmg-Coa Reductase Inhibitors Unknown     Makes him feel funny, dizziness, Makes him feel funny, dizziness    Bad reaction of high dose of lipitor    Thiazides Other     headache, headache    Weed Pollen-Short Ragweed Other       Visit Vitals  /73 (BP Location: Right arm, Patient Position: Sitting, BP Cuff Size: Adult)   Pulse 75   Ht 1.854 m (6' 1\")   Wt 83.9 kg (185 lb)   SpO2 100%   BMI 24.41 kg/m²   Smoking Status Former   BSA 2.08 m²        Physical Exam:  On exam Mr. Nolasco appears his stated age, is alert and oriented x3, and in no acute distress. His sclera are anicteric and his oropharynx has moist mucous membranes. His neck is supple and without thyromegaly. The JVP is ~6 cm of water above the right atrium. His cardiac exam has regular rhythm, normal S1, S2. No S3/4. There is a II/VI HSM. His lungs are clear to auscultation bilaterally and there is no dullness to percussion. His abdomen is soft, nontender with normoactive bowel sounds. There is no HJR. The extremities are warm and without sig edema. The skin is dry. There is no rash present. The distal pulses are 2+ in all four extremities. His mood and affect are appropriate for todays encounter.       Cardiac Labs/Diagnostics:    Lab Results   Component Value Date    CREATININE 0.72 06/24/2024    BUN 19 06/24/2024     (L) " 06/24/2024    K 4.5 06/24/2024    CL 93 (L) 06/24/2024    CO2 30 06/24/2024          Recent Labs     06/24/24  1303 02/21/24  1325 02/07/24  1329 01/10/24  1243 12/22/23  1356   * 338* 373* 312* 382*       ECG (7/16/24):  A-V paced (HR 79)    Echo (3/1/24; [facility]):  V-V optimization    Echo (1/12/24; [facility]):  LVEF 25-30%    Echo (10/20/23; [facility])  LVEF 25-30%  Mod MR  Mildly enlarged RV      Impression/Plan:  Mr. 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 At the current time he has functional class II symptoms and appears euvolemic on exam.     1) Stage C chronic systolic HF/NICM/HFrEF with severe LV dysfunction (LVEF 25-30%; 1/2024) s/p CRT-D  Cardiomyopathy out of proportion to CAD. s/p DCCV earlier this year for AF (while in AF he experienced RV pacing; otherwise he was atrial paced). He underwent placement of CRT-D. He was unable to tolerate most GDMT due to symptomatic hypotension (pat was stopped, entresto now daily, and jardiance stopped). Labs still with hyponatremia. He is scheduled for a repeat echo in 3 months with Dr. MURRAY.    -c/w metoprolol succinate 100 mg daily, entresto 12/13 mg once a day, furosemide 20 mg daily/prn  -will f/u echo when available       F/U: 6 months at /[facility]    Dr. MURRAY,  Thank you for referring Mr. Nolasco to the  Advanced Heart Failure Clinic. Please let me know if you have any questions.      Addendum (11/5/24):  Pt called noting severe recurring epistaxis. Pt is open to discuss LAAO with Structural.                                 Shared Decision Tool - Referral for Left Atrial Appendage Occlusion    My patient Jarad Nolasco 1945 has been evaluated by me and is referred to [facility] for a left atrial appendage implant due thromboembolic stroke risk from  atrial fibrillation with CHADS2 score >= 2 or a [id number] score >= 3.    This patient is not a good candidate for long term anticoagulation for the following reason(s):    This patient however should be able to tolerate short term anticoagulation as necessary for LAAO device implant.  My Patient and I, the referring physician, have reviewed the following:  Yes  Atrial Fibrillation increases the risk of stroke.  Yes Anticoagulants or blood thinners help reduce the risk of stroke for most people.  Yes    Blood thinners may cause minor or serious bleeding issues.  Yes  Blood thinners include the medications aspirin, warfarin, and NOAC (dabigatran, edoxaban, rivaroxaban, apixaban...), each with unique risk and safety profiles.  Yes We reviewed his/her anticoagulation history and previous experiences.  Yes We discussed lack of other alternative treatments available to prevent stroke risk.  Yes We discussed the LAAO device implant vs taking anticoagulation to reduce stroke risk.  Yes We referred the patient to a LAAO outpatient clinic for further explanation and consideration.          Yes The LAAO device has been adequately explained along with the risks and benefits of treatment. Alternative treatment has been discussed with all questions answered. After discussion of the above considerations, it has been decided to be evaluated for the LAAO therapies.    Reviewed and approved by SEGUN PARKINSON on 11/5/24 at 11:09 AM.      ____________________________________________________________  Segun Parkinson DO  Section of Advanced Heart Failure and Cardiac Transplantation  Division of Cardiovascular Medicine  [facility] [place]  [facility]

## 2024-08-19 ENCOUNTER — LAB (OUTPATIENT)
Dept: LAB | Facility: LAB | Age: 79
End: 2024-08-19
Payer: MEDICARE

## 2024-08-19 DIAGNOSIS — I50.20 ACC/AHA STAGE C SYSTOLIC HEART FAILURE (MULTI): ICD-10-CM

## 2024-08-19 LAB
ALBUMIN SERPL BCP-MCNC: 4 G/DL (ref 3.4–5)
ANION GAP SERPL CALC-SCNC: 8 MMOL/L (ref 10–20)
BNP SERPL-MCNC: 436 PG/ML (ref 0–99)
BUN SERPL-MCNC: 23 MG/DL (ref 6–23)
CALCIUM SERPL-MCNC: 8.8 MG/DL (ref 8.6–10.3)
CHLORIDE SERPL-SCNC: 91 MMOL/L (ref 98–107)
CO2 SERPL-SCNC: 29 MMOL/L (ref 21–32)
CREAT SERPL-MCNC: 0.71 MG/DL (ref 0.5–1.3)
EGFRCR SERPLBLD CKD-EPI 2021: >90 ML/MIN/1.73M*2
GLUCOSE SERPL-MCNC: 107 MG/DL (ref 74–99)
PHOSPHATE SERPL-MCNC: 4.6 MG/DL (ref 2.5–4.9)
POTASSIUM SERPL-SCNC: 4.3 MMOL/L (ref 3.5–5.3)
SODIUM SERPL-SCNC: 124 MMOL/L (ref 136–145)

## 2024-08-19 PROCEDURE — 83880 ASSAY OF NATRIURETIC PEPTIDE: CPT

## 2024-08-19 PROCEDURE — 36415 COLL VENOUS BLD VENIPUNCTURE: CPT

## 2024-08-19 PROCEDURE — 80069 RENAL FUNCTION PANEL: CPT

## 2024-08-22 DIAGNOSIS — I50.20 ACC/AHA STAGE C SYSTOLIC HEART FAILURE (MULTI): Primary | ICD-10-CM

## 2024-08-22 DIAGNOSIS — I42.8 NONISCHEMIC CARDIOMYOPATHY (MULTI): ICD-10-CM

## 2024-09-18 ENCOUNTER — LAB (OUTPATIENT)
Dept: LAB | Facility: LAB | Age: 79
End: 2024-09-18
Payer: MEDICARE

## 2024-09-18 DIAGNOSIS — I50.20 ACC/AHA STAGE C SYSTOLIC HEART FAILURE: ICD-10-CM

## 2024-09-18 DIAGNOSIS — I42.8 NONISCHEMIC CARDIOMYOPATHY (MULTI): ICD-10-CM

## 2024-09-18 LAB
CREAT UR-MCNC: 44.3 MG/DL (ref 20–370)
OSMOLALITY SERPL: 276 MOSM/KG (ref 280–300)
SODIUM UR-SCNC: 35 MMOL/L
SODIUM/CREAT UR-RTO: 79 MMOL/G CREAT

## 2024-09-18 PROCEDURE — 84300 ASSAY OF URINE SODIUM: CPT

## 2024-09-18 PROCEDURE — 83930 ASSAY OF BLOOD OSMOLALITY: CPT

## 2024-09-18 PROCEDURE — 82570 ASSAY OF URINE CREATININE: CPT

## 2024-09-18 PROCEDURE — 36415 COLL VENOUS BLD VENIPUNCTURE: CPT

## 2024-09-24 DIAGNOSIS — I50.20 ACC/AHA STAGE C SYSTOLIC HEART FAILURE: Primary | ICD-10-CM

## 2024-09-24 DIAGNOSIS — I42.8 NONISCHEMIC CARDIOMYOPATHY (MULTI): ICD-10-CM

## 2024-09-24 RX ORDER — TOLVAPTAN 15 MG/1
15 TABLET ORAL ONCE
Qty: 1 TABLET | Refills: 0 | Status: SHIPPED | OUTPATIENT
Start: 2024-09-24 | End: 2024-09-24

## 2024-10-04 ENCOUNTER — LAB (OUTPATIENT)
Dept: LAB | Facility: LAB | Age: 79
End: 2024-10-04
Payer: MEDICARE

## 2024-10-04 DIAGNOSIS — I50.20 ACC/AHA STAGE C SYSTOLIC HEART FAILURE: ICD-10-CM

## 2024-10-04 DIAGNOSIS — I42.8 NONISCHEMIC CARDIOMYOPATHY (MULTI): ICD-10-CM

## 2024-10-04 LAB
ALBUMIN SERPL BCP-MCNC: 3.8 G/DL (ref 3.4–5)
ANION GAP SERPL CALC-SCNC: 10 MMOL/L (ref 10–20)
BNP SERPL-MCNC: 358 PG/ML (ref 0–99)
BUN SERPL-MCNC: 28 MG/DL (ref 6–23)
CALCIUM SERPL-MCNC: 8.3 MG/DL (ref 8.6–10.3)
CHLORIDE SERPL-SCNC: 99 MMOL/L (ref 98–107)
CO2 SERPL-SCNC: 29 MMOL/L (ref 21–32)
CREAT SERPL-MCNC: 0.78 MG/DL (ref 0.5–1.3)
EGFRCR SERPLBLD CKD-EPI 2021: >90 ML/MIN/1.73M*2
GLUCOSE SERPL-MCNC: 150 MG/DL (ref 74–99)
PHOSPHATE SERPL-MCNC: 4.1 MG/DL (ref 2.5–4.9)
POTASSIUM SERPL-SCNC: 4.2 MMOL/L (ref 3.5–5.3)
SODIUM SERPL-SCNC: 134 MMOL/L (ref 136–145)
TSH SERPL-ACNC: 1.72 MIU/L (ref 0.44–3.98)

## 2024-10-04 PROCEDURE — 83880 ASSAY OF NATRIURETIC PEPTIDE: CPT

## 2024-10-04 PROCEDURE — 80069 RENAL FUNCTION PANEL: CPT

## 2024-10-04 PROCEDURE — 84443 ASSAY THYROID STIM HORMONE: CPT

## 2024-10-04 PROCEDURE — 36415 COLL VENOUS BLD VENIPUNCTURE: CPT

## 2024-10-07 DIAGNOSIS — I50.20 ACC/AHA STAGE C SYSTOLIC HEART FAILURE: Primary | ICD-10-CM

## 2024-10-07 DIAGNOSIS — I42.8 NONISCHEMIC CARDIOMYOPATHY (MULTI): ICD-10-CM

## 2024-11-05 DIAGNOSIS — I48.0 PAROXYSMAL ATRIAL FIBRILLATION (MULTI): Primary | ICD-10-CM

## 2024-12-09 ENCOUNTER — LAB (OUTPATIENT)
Dept: LAB | Facility: LAB | Age: 79
End: 2024-12-09
Payer: MEDICARE

## 2024-12-09 DIAGNOSIS — I42.8 NONISCHEMIC CARDIOMYOPATHY (MULTI): ICD-10-CM

## 2024-12-09 DIAGNOSIS — I50.20 ACC/AHA STAGE C SYSTOLIC HEART FAILURE: ICD-10-CM

## 2024-12-09 LAB
ALBUMIN SERPL BCP-MCNC: 3.8 G/DL (ref 3.4–5)
ANION GAP SERPL CALC-SCNC: 9 MMOL/L (ref 10–20)
BNP SERPL-MCNC: 839 PG/ML (ref 0–99)
BUN SERPL-MCNC: 23 MG/DL (ref 6–23)
CALCIUM SERPL-MCNC: 8.7 MG/DL (ref 8.6–10.3)
CHLORIDE SERPL-SCNC: 100 MMOL/L (ref 98–107)
CO2 SERPL-SCNC: 30 MMOL/L (ref 21–32)
CREAT SERPL-MCNC: 0.82 MG/DL (ref 0.5–1.3)
EGFRCR SERPLBLD CKD-EPI 2021: 90 ML/MIN/1.73M*2
GLUCOSE SERPL-MCNC: 94 MG/DL (ref 74–99)
PHOSPHATE SERPL-MCNC: 4 MG/DL (ref 2.5–4.9)
POTASSIUM SERPL-SCNC: 4.3 MMOL/L (ref 3.5–5.3)
SODIUM SERPL-SCNC: 135 MMOL/L (ref 136–145)

## 2024-12-09 PROCEDURE — 36415 COLL VENOUS BLD VENIPUNCTURE: CPT

## 2024-12-09 PROCEDURE — 80069 RENAL FUNCTION PANEL: CPT

## 2024-12-09 PROCEDURE — 83880 ASSAY OF NATRIURETIC PEPTIDE: CPT

## 2024-12-24 DIAGNOSIS — I50.20 ACC/AHA STAGE C SYSTOLIC HEART FAILURE: ICD-10-CM

## 2024-12-24 DIAGNOSIS — I42.8 NONISCHEMIC CARDIOMYOPATHY (MULTI): Primary | ICD-10-CM

## 2024-12-24 RX ORDER — EMPAGLIFLOZIN 10 MG/1
TABLET, FILM COATED ORAL
Qty: 90 TABLET | Refills: 3 | Status: SHIPPED | OUTPATIENT
Start: 2024-12-24

## 2025-01-10 DIAGNOSIS — I50.20 ACC/AHA STAGE C SYSTOLIC HEART FAILURE: ICD-10-CM

## 2025-01-10 RX ORDER — SACUBITRIL AND VALSARTAN 24; 26 MG/1; MG/1
1 TABLET, FILM COATED ORAL 2 TIMES DAILY
Qty: 180 TABLET | Refills: 3 | Status: SHIPPED | OUTPATIENT
Start: 2025-01-10 | End: 2026-01-10

## 2025-01-29 ENCOUNTER — APPOINTMENT (OUTPATIENT)
Dept: CARDIOLOGY | Facility: CLINIC | Age: 80
End: 2025-01-29
Payer: MEDICARE

## 2025-02-04 LAB
ALBUMIN SERPL-MCNC: 4.1 G/DL (ref 3.6–5.1)
BNP SERPL-MCNC: 537 PG/ML
BUN SERPL-MCNC: 22 MG/DL (ref 7–25)
BUN/CREAT SERPL: ABNORMAL (CALC) (ref 6–22)
CALCIUM SERPL-MCNC: 8.6 MG/DL (ref 8.6–10.3)
CHLORIDE SERPL-SCNC: 97 MMOL/L (ref 98–110)
CO2 SERPL-SCNC: 30 MMOL/L (ref 20–32)
CREAT SERPL-MCNC: 0.78 MG/DL (ref 0.7–1.28)
EGFRCR SERPLBLD CKD-EPI 2021: 91 ML/MIN/1.73M2
GLUCOSE SERPL-MCNC: 107 MG/DL (ref 65–99)
PHOSPHATE SERPL-MCNC: 4.1 MG/DL (ref 2.1–4.3)
POTASSIUM SERPL-SCNC: 4.9 MMOL/L (ref 3.5–5.3)
SODIUM SERPL-SCNC: 132 MMOL/L (ref 135–146)

## 2025-02-24 ENCOUNTER — APPOINTMENT (OUTPATIENT)
Dept: CARDIOLOGY | Facility: CLINIC | Age: 80
End: 2025-02-24
Payer: MEDICARE

## 2025-02-24 VITALS
HEIGHT: 73 IN | HEART RATE: 92 BPM | BODY MASS INDEX: 24.52 KG/M2 | SYSTOLIC BLOOD PRESSURE: 113 MMHG | WEIGHT: 185 LBS | OXYGEN SATURATION: 95 % | DIASTOLIC BLOOD PRESSURE: 74 MMHG

## 2025-02-24 DIAGNOSIS — I50.20 ACC/AHA STAGE C SYSTOLIC HEART FAILURE: Primary | ICD-10-CM

## 2025-02-24 DIAGNOSIS — I42.8 NONISCHEMIC CARDIOMYOPATHY (MULTI): ICD-10-CM

## 2025-02-24 PROCEDURE — 1159F MED LIST DOCD IN RCRD: CPT | Performed by: INTERNAL MEDICINE

## 2025-02-24 PROCEDURE — 1160F RVW MEDS BY RX/DR IN RCRD: CPT | Performed by: INTERNAL MEDICINE

## 2025-02-24 PROCEDURE — 99213 OFFICE O/P EST LOW 20 MIN: CPT | Performed by: INTERNAL MEDICINE

## 2025-02-24 PROCEDURE — 1036F TOBACCO NON-USER: CPT | Performed by: INTERNAL MEDICINE

## 2025-02-24 NOTE — PROGRESS NOTES
[facility]  Primary Care Physician: Rick Sawyer  Referring Provider/Cardiologist: Lore ([facility])     Date of Visit: 02/24/2025  4:00 PM EST  Location of visit: [address]     HPI:   Mr. 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     Interval hx:   He recently saw Dr. Starkey re: Watchman at [facility].     He currently denies chest pain, palpitations, shortness of breath, dyspnea on exertion, orthopnea, PND. No edema noted in BLE. He denies headaches, dizziness or recent falls.     Hospitalizations: ED 6/6/24 Epistaxis; 4/28/24 ED Cystitis       PMHx:  CAD s/p PCI (RCA; 2006), stage C systolic HF/NICM/HFrEF with severe LV dysfunction s/p CRT-D, AF on DOAC, SSS s/p ppm, HTN, DM    SocHx:  , lives with his wife in [facility]  Denies tobacco/etoh/illicits    FamHx:  Father with CAD      Current Outpatient Medications   Medication Sig Dispense Refill    albuterol 90 mcg/actuation inhaler Inhale 2 puffs every 4 hours if needed.      ALPRAZolam (Xanax) 1 mg tablet Take by mouth.      apixaban (Eliquis) 5 mg tablet Take 1 tablet (5 mg) by mouth 2 times a day. (Patient taking differently: Take 0.5 tablets (2.5 mg) by mouth 2 times a day.) 60 tablet 10    aspirin 81 mg EC tablet Take by mouth.      atorvastatin (Lipitor) 10 mg tablet Take 1 tablet (10 mg) by mouth every other day.      furosemide (Lasix) 20 mg tablet Take 1 tablet (20 mg) by mouth if needed.      HumuLIN N NPH U-100 Insulin 100 unit/mL injection mild scale, Subcutaneous, DAILY AFTER BREAKFAST, Date: 1/25/17 8:07:00 AM EST, Injection Charge      Jardiance 10 mg TAKE 1 TABLET(10 MG) BY MOUTH ONCE DAILY 90 tablet 3    levalbuterol (Xopenex) 45 mcg/actuation inhaler USE 2 PUFFS BY MOUTH  "EVERY 4 HOURS AS NEEDED FOR COUGH OR WHEEZE (PLEASE REORDER 2 BUSINESS DAYS IN ADVANCE)      linaCLOtide (Linzess) 145 mcg capsule Take 1 capsule (145 mcg) by mouth once daily in the morning. Take before meals. Do not crush or chew.      metoprolol succinate XL (Toprol-XL) 100 mg 24 hr tablet TAKE 2 TABLETS(200 MG) BY MOUTH EVERY DAY. DO NOT CRUSH OR CHEW (Patient taking differently: 1 tablet (100 mg) once daily.) 180 tablet 2    montelukast (Singulair) 10 mg tablet       OneTouch Ultra Test strip       sacubitriL-valsartan (Entresto) 24-26 mg tablet Take 1 tablet by mouth 2 times a day. 180 tablet 3     No current facility-administered medications for this visit.       Allergies   Allergen Reactions    Allerg Xt,D.Farinae-D.Pteronys Shortness of breath     Pt states slight shortness of breath.    Ace Inhibitors Other     dizziness even though bp normal to high, dizziness even though bp normal to high    dizziness, dizziness    Acetaminophen Other     Pain    creates pain    Allopurinol Analogues Unknown    Chlorphen-Phenyleph-Hydrocodon Other    Codeine Other    Epinephrine Other     Pt. States blood pressure \"shoots up\"       Pt. States blood pressure \"shoots up\"    Felodipine Unknown     Headache, Headache    Haemophilus Influenzae Type B Other     Other reaction(s): Asthma and/or Shortness of Breath    House Dust Mite Other    Hydrocodone-Acetaminophen Other     04/07/2005;HEART ATTACK SYMPTOMS, 04/07/2005;HEART ATTACK SYMPTOMS    Other reaction(s): Asthma and/or Shortness of Breath, Other (See Comments)   04/07/2005;HEART ATTACK SYMPTOMS, 04/07/2005;HEART ATTACK SYMPTOMS    Losartan Other     Dizzy, Refuses to use it again, Dizzy, Refuses to use it again    Metformin Other     09/10/2002;CONTINUOUS HEADACHE, 09/10/2002;CONTINUOUS HEADACHE    Other reaction(s): Other (See Comments)   09/10/2002;CONTINUOUS HEADACHE, 09/10/2002;CONTINUOUS HEADACHE    Nortriptyline Other     Felt very sick, Felt very sick    " "Penicillin Hives    Pioglitazone Other     07/29/2005;DIZZINESS, 07/29/2005;DIZZINESS    Other reaction(s): Other (See Comments)   07/29/2005;DIZZINESS, 07/29/2005;DIZZINESS    Ragweed Other    Statins-Hmg-Coa Reductase Inhibitors Unknown     Makes him feel funny, dizziness, Makes him feel funny, dizziness    Bad reaction of high dose of lipitor    Thiazides Other     headache, headache    Weed Pollen-Short Ragweed Other       Visit Vitals  /74 (BP Location: Right arm, Patient Position: Sitting)   Pulse 92   Ht 1.854 m (6' 1\")   Wt 83.9 kg (185 lb)   SpO2 95%   BMI 24.41 kg/m²   Smoking Status Former   BSA 2.08 m²        Physical Exam:  On exam Mr. Nolasco appears his stated age, is alert and oriented x3, and in no acute distress. His sclera are anicteric and his oropharynx has moist mucous membranes. His neck is supple and without thyromegaly. The JVP is ~6 cm of water above the right atrium. His cardiac exam has regular rhythm, normal S1, S2. No S3/4. There is a II/VI HSM. His lungs are clear to auscultation bilaterally and there is no dullness to percussion. His abdomen is soft, nontender with normoactive bowel sounds. There is no HJR. The extremities are warm and without sig edema. The skin is dry. There is no rash present. The distal pulses are 2+ in all four extremities. His mood and affect are appropriate for todays encounter.       Cardiac Labs/Diagnostics:    Lab Results   Component Value Date    CREATININE 0.78 02/03/2025    BUN 22 02/03/2025     (L) 02/03/2025    K 4.9 02/03/2025    CL 97 (L) 02/03/2025    CO2 30 02/03/2025          Recent Labs     02/03/25  1245 12/09/24  1137 10/04/24  1514 08/19/24  1229 06/24/24  1303   * 839* 358* 436* 702*       ECG (7/16/24):  A-V paced (HR 79)    Echo (3/1/24; [facility]):  V-V optimization    Echo (1/12/24; [facility]):  LVEF 25-30%    Echo (10/20/23; [facility])  LVEF 25-30%  Mod MR  Mildly enlarged RV      Impression/Plan:  Mr. Nolasco is a 79M with a PMHx " sig for CAD s/p PCI (RCA; 2006), stage C systolic HF/NICM/HFrEF with severe LV dysfunction s/p CRT-D, AF on DOAC, SSS s/p ppm, HTN, and DM who returns to the Advanced Heart Failure clinic for ongoing evaluation and management. At the current time he has functional class II symptoms and appears euvolemic on exam.     1) Stage C chronic systolic HF/NICM/HFrEF with severe LV dysfunction (LVEF 25-30%; 1/2024) s/p CRT-D  Cardiomyopathy out of proportion to CAD. He is unable to tolerate most GDMT due to symptomatic hypotension (pat was stopped, entresto now daily, and jardiance stopped). Labs with intermittent hyponatremia, which has responded to tolvaptan 15 mg x1.   -c/w metoprolol succinate 100 mg daily, entresto 12/13 mg once a day, furosemide 20 mg daily/prn    2) pAF on DOAC  He recently saw Dr. Starkey re: Watchman at [facility].       F/U: 6 months at /[facility]    Dr. MURRAY,  Thank you for referring Mr. Nolasco to the  Advanced Heart Failure Clinic. Please let me know if you have any questions.      ____________________________________________________________  Jarad Parkinson DO  Section of Advanced Heart Failure and Cardiac Transplantation  Division of Cardiovascular Medicine  [facility]  [facility]

## 2025-02-24 NOTE — PATIENT INSTRUCTIONS
It was a pleasure seeing you today. Please contact myself or my team with any questions.     To reach Dr. Parkinson' office please call [phone] ([place]).   Fax: [phone]   To schedule an appointment call [phone]     If you have any questions or need cardiac medication refills, please call the Heart Failure office at [phone], option [phone]. You may also contact the  Heart Failure Nursing team via email at [email or web address] (Please include your name and date of birth).        1) Continue your current medications  2) See Dr. Starkey re: Watchman  3) Follow up in 6 months at /[facility]

## 2025-02-25 ENCOUNTER — APPOINTMENT (OUTPATIENT)
Dept: CARDIOLOGY | Facility: CLINIC | Age: 80
End: 2025-02-25
Payer: MEDICARE

## 2025-03-10 ENCOUNTER — APPOINTMENT (OUTPATIENT)
Dept: CARDIOLOGY | Facility: CLINIC | Age: 80
End: 2025-03-10
Payer: MEDICARE

## 2025-03-19 LAB
ALBUMIN SERPL-MCNC: 4.2 G/DL (ref 3.6–5.1)
BNP SERPL-MCNC: 613 PG/ML
BUN SERPL-MCNC: 24 MG/DL (ref 7–25)
BUN/CREAT SERPL: ABNORMAL (CALC) (ref 6–22)
CALCIUM SERPL-MCNC: 8.9 MG/DL (ref 8.6–10.3)
CHLORIDE SERPL-SCNC: 96 MMOL/L (ref 98–110)
CO2 SERPL-SCNC: 28 MMOL/L (ref 20–32)
CREAT SERPL-MCNC: 0.81 MG/DL (ref 0.7–1.28)
EGFRCR SERPLBLD CKD-EPI 2021: 90 ML/MIN/1.73M2
GLUCOSE SERPL-MCNC: 122 MG/DL (ref 65–99)
PHOSPHATE SERPL-MCNC: 4.2 MG/DL (ref 2.1–4.3)
POTASSIUM SERPL-SCNC: 4.8 MMOL/L (ref 3.5–5.3)
SODIUM SERPL-SCNC: 129 MMOL/L (ref 135–146)

## 2025-03-21 DIAGNOSIS — I50.20 ACC/AHA STAGE C SYSTOLIC HEART FAILURE: Primary | ICD-10-CM

## 2025-03-21 DIAGNOSIS — I42.8 NONISCHEMIC CARDIOMYOPATHY (MULTI): ICD-10-CM

## 2025-03-21 RX ORDER — FUROSEMIDE 20 MG/1
20 TABLET ORAL DAILY
Qty: 90 TABLET | Refills: 3 | Status: SHIPPED | OUTPATIENT
Start: 2025-03-21 | End: 2026-03-21

## 2025-04-08 DIAGNOSIS — I48.0 PAROXYSMAL ATRIAL FIBRILLATION (MULTI): ICD-10-CM

## 2025-04-12 LAB
ALBUMIN SERPL-MCNC: 4.1 G/DL (ref 3.6–5.1)
BNP SERPL-MCNC: NORMAL PG/ML
BUN SERPL-MCNC: 21 MG/DL (ref 7–25)
BUN/CREAT SERPL: ABNORMAL (CALC) (ref 6–22)
CALCIUM SERPL-MCNC: 8.8 MG/DL (ref 8.6–10.3)
CHLORIDE SERPL-SCNC: 97 MMOL/L (ref 98–110)
CO2 SERPL-SCNC: 29 MMOL/L (ref 20–32)
CREAT SERPL-MCNC: 0.77 MG/DL (ref 0.7–1.28)
EGFRCR SERPLBLD CKD-EPI 2021: 92 ML/MIN/1.73M2
GLUCOSE SERPL-MCNC: 158 MG/DL (ref 65–99)
PHOSPHATE SERPL-MCNC: 4 MG/DL (ref 2.1–4.3)
POTASSIUM SERPL-SCNC: 4.7 MMOL/L (ref 3.5–5.3)
SODIUM SERPL-SCNC: 133 MMOL/L (ref 135–146)

## 2025-04-14 LAB
ALBUMIN SERPL-MCNC: 4.1 G/DL (ref 3.6–5.1)
BNP SERPL-MCNC: 555 PG/ML
BUN SERPL-MCNC: 21 MG/DL (ref 7–25)
BUN/CREAT SERPL: ABNORMAL (CALC) (ref 6–22)
CALCIUM SERPL-MCNC: 8.8 MG/DL (ref 8.6–10.3)
CHLORIDE SERPL-SCNC: 97 MMOL/L (ref 98–110)
CO2 SERPL-SCNC: 29 MMOL/L (ref 20–32)
CREAT SERPL-MCNC: 0.77 MG/DL (ref 0.7–1.28)
EGFRCR SERPLBLD CKD-EPI 2021: 92 ML/MIN/1.73M2
GLUCOSE SERPL-MCNC: 158 MG/DL (ref 65–99)
PHOSPHATE SERPL-MCNC: 4 MG/DL (ref 2.1–4.3)
POTASSIUM SERPL-SCNC: 4.7 MMOL/L (ref 3.5–5.3)
SODIUM SERPL-SCNC: 133 MMOL/L (ref 135–146)

## 2025-05-14 DIAGNOSIS — I50.20 ACC/AHA STAGE C SYSTOLIC HEART FAILURE: Primary | ICD-10-CM

## 2025-06-10 DIAGNOSIS — E87.1 HYPONATREMIA: Primary | ICD-10-CM

## 2025-06-10 LAB
ALBUMIN SERPL-MCNC: 4 G/DL (ref 3.6–5.1)
BNP SERPL-MCNC: 654 PG/ML
BUN SERPL-MCNC: 19 MG/DL (ref 7–25)
BUN/CREAT SERPL: ABNORMAL (CALC) (ref 6–22)
CALCIUM SERPL-MCNC: 8.5 MG/DL (ref 8.6–10.3)
CHLORIDE SERPL-SCNC: 88 MMOL/L (ref 98–110)
CO2 SERPL-SCNC: 25 MMOL/L (ref 20–32)
CREAT SERPL-MCNC: 0.84 MG/DL (ref 0.7–1.28)
EGFRCR SERPLBLD CKD-EPI 2021: 89 ML/MIN/1.73M2
GLUCOSE SERPL-MCNC: 106 MG/DL (ref 65–99)
PHOSPHATE SERPL-MCNC: 4.3 MG/DL (ref 2.1–4.3)
POTASSIUM SERPL-SCNC: 4.8 MMOL/L (ref 3.5–5.3)
SODIUM SERPL-SCNC: 121 MMOL/L (ref 135–146)

## 2025-06-10 RX ORDER — TOLVAPTAN 15 MG/1
15 TABLET ORAL ONCE
Qty: 1 TABLET | Refills: 0 | Status: SHIPPED | OUTPATIENT
Start: 2025-06-10 | End: 2025-06-10

## 2025-06-11 DIAGNOSIS — I42.8 NONISCHEMIC CARDIOMYOPATHY (MULTI): ICD-10-CM

## 2025-06-11 DIAGNOSIS — I50.20 ACC/AHA STAGE C SYSTOLIC HEART FAILURE: ICD-10-CM

## 2025-06-11 RX ORDER — METOPROLOL SUCCINATE 100 MG/1
TABLET, EXTENDED RELEASE ORAL
Qty: 180 TABLET | Refills: 2 | Status: SHIPPED | OUTPATIENT
Start: 2025-06-11

## 2025-06-17 DIAGNOSIS — E87.1 HYPONATREMIA: ICD-10-CM

## 2025-06-27 LAB
ALBUMIN SERPL-MCNC: 4.1 G/DL (ref 3.6–5.1)
BNP SERPL-MCNC: 443 PG/ML
BUN SERPL-MCNC: 25 MG/DL (ref 7–25)
BUN/CREAT SERPL: 37 (CALC) (ref 6–22)
CALCIUM SERPL-MCNC: 8.8 MG/DL (ref 8.6–10.3)
CHLORIDE SERPL-SCNC: 92 MMOL/L (ref 98–110)
CO2 SERPL-SCNC: 26 MMOL/L (ref 20–32)
CREAT SERPL-MCNC: 0.68 MG/DL (ref 0.7–1.28)
EGFRCR SERPLBLD CKD-EPI 2021: 95 ML/MIN/1.73M2
GLUCOSE SERPL-MCNC: 110 MG/DL (ref 65–99)
PHOSPHATE SERPL-MCNC: 4.1 MG/DL (ref 2.1–4.3)
POTASSIUM SERPL-SCNC: 4.4 MMOL/L (ref 3.5–5.3)
SODIUM SERPL-SCNC: 125 MMOL/L (ref 135–146)

## 2025-08-21 LAB
ALBUMIN SERPL-MCNC: 4 G/DL (ref 3.6–5.1)
BNP SERPL-MCNC: 797 PG/ML
BUN SERPL-MCNC: 19 MG/DL (ref 7–25)
BUN/CREAT SERPL: 33 (CALC) (ref 6–22)
CALCIUM SERPL-MCNC: 8.8 MG/DL (ref 8.6–10.3)
CHLORIDE SERPL-SCNC: 92 MMOL/L (ref 98–110)
CO2 SERPL-SCNC: 28 MMOL/L (ref 20–32)
CREAT SERPL-MCNC: 0.58 MG/DL (ref 0.7–1.28)
EGFRCR SERPLBLD CKD-EPI 2021: 99 ML/MIN/1.73M2
GLUCOSE SERPL-MCNC: 133 MG/DL (ref 65–139)
PHOSPHATE SERPL-MCNC: 3.7 MG/DL (ref 2.1–4.3)
POTASSIUM SERPL-SCNC: 4.5 MMOL/L (ref 3.5–5.3)
SODIUM SERPL-SCNC: 126 MMOL/L (ref 135–146)

## 2025-08-26 ENCOUNTER — APPOINTMENT (OUTPATIENT)
Dept: CARDIOLOGY | Facility: CLINIC | Age: 80
End: 2025-08-26
Payer: COMMERCIAL

## 2025-08-26 VITALS
HEART RATE: 73 BPM | DIASTOLIC BLOOD PRESSURE: 68 MMHG | HEIGHT: 73 IN | WEIGHT: 185 LBS | SYSTOLIC BLOOD PRESSURE: 108 MMHG | BODY MASS INDEX: 24.52 KG/M2

## 2025-08-26 DIAGNOSIS — I50.20 ACC/AHA STAGE C SYSTOLIC HEART FAILURE: Primary | ICD-10-CM

## 2025-08-26 DIAGNOSIS — E87.1 HYPONATREMIA: ICD-10-CM

## 2025-08-26 DIAGNOSIS — I48.0 PAROXYSMAL ATRIAL FIBRILLATION (MULTI): ICD-10-CM

## 2025-08-26 DIAGNOSIS — I42.8 NONISCHEMIC CARDIOMYOPATHY (MULTI): ICD-10-CM

## 2025-08-26 PROCEDURE — 93000 ELECTROCARDIOGRAM COMPLETE: CPT | Performed by: INTERNAL MEDICINE

## 2025-08-26 PROCEDURE — 99214 OFFICE O/P EST MOD 30 MIN: CPT | Performed by: INTERNAL MEDICINE

## 2025-08-26 PROCEDURE — 1159F MED LIST DOCD IN RCRD: CPT | Performed by: INTERNAL MEDICINE

## 2025-08-26 PROCEDURE — 1036F TOBACCO NON-USER: CPT | Performed by: INTERNAL MEDICINE

## 2025-08-26 PROCEDURE — 1160F RVW MEDS BY RX/DR IN RCRD: CPT | Performed by: INTERNAL MEDICINE

## 2025-08-26 RX ORDER — FINASTERIDE 5 MG/1
5 TABLET, FILM COATED ORAL
COMMUNITY
Start: 2025-08-20

## 2025-09-08 ENCOUNTER — APPOINTMENT (OUTPATIENT)
Dept: CARDIOLOGY | Facility: CLINIC | Age: 80
End: 2025-09-08
Payer: COMMERCIAL

## 2026-02-17 ENCOUNTER — APPOINTMENT (OUTPATIENT)
Dept: CARDIOLOGY | Facility: CLINIC | Age: 81
End: 2026-02-17
Payer: COMMERCIAL